# Patient Record
Sex: MALE | Race: WHITE | NOT HISPANIC OR LATINO | Employment: FULL TIME | URBAN - METROPOLITAN AREA
[De-identification: names, ages, dates, MRNs, and addresses within clinical notes are randomized per-mention and may not be internally consistent; named-entity substitution may affect disease eponyms.]

---

## 2017-12-23 ENCOUNTER — HOSPITAL ENCOUNTER (EMERGENCY)
Facility: HOSPITAL | Age: 32
Discharge: HOME | End: 2017-12-23
Attending: EMERGENCY MEDICINE | Admitting: EMERGENCY MEDICINE

## 2017-12-23 VITALS
RESPIRATION RATE: 16 BRPM | BODY MASS INDEX: 24.25 KG/M2 | TEMPERATURE: 98.3 F | SYSTOLIC BLOOD PRESSURE: 129 MMHG | HEART RATE: 64 BPM | WEIGHT: 160 LBS | DIASTOLIC BLOOD PRESSURE: 79 MMHG | HEIGHT: 68 IN | OXYGEN SATURATION: 98 %

## 2017-12-23 DIAGNOSIS — J11.1 INFLUENZA: Primary | ICD-10-CM

## 2017-12-23 PROCEDURE — 99283 EMERGENCY DEPT VISIT LOW MDM: CPT

## 2017-12-23 PROCEDURE — 99282 EMERGENCY DEPT VISIT SF MDM: CPT | Performed by: EMERGENCY MEDICINE

## 2017-12-23 RX ORDER — ALBUTEROL SULFATE 90 UG/1
AEROSOL, METERED RESPIRATORY (INHALATION)
Qty: 1 INHALER | Refills: 0 | Status: SHIPPED | OUTPATIENT
Start: 2017-12-23 | End: 2018-05-05

## 2017-12-23 RX ORDER — IBUPROFEN 800 MG/1
TABLET ORAL
Qty: 30 TABLET | Refills: 0 | Status: SHIPPED | OUTPATIENT
Start: 2017-12-23 | End: 2018-05-05

## 2017-12-23 RX ORDER — MULTIPLE VITAMINS W/ MINERALS TAB 9MG-400MCG
1 TAB ORAL DAILY
COMMUNITY
End: 2018-05-05

## 2017-12-23 RX ORDER — LEVOTHYROXINE SODIUM 0.1 MG/1
100 TABLET ORAL DAILY
COMMUNITY
End: 2018-05-05

## 2017-12-24 NOTE — ED PROVIDER NOTES
Subjective   History of Present Illness     History of Present Illness    Chief complaint: Flulike symptoms    Location: Head, chest    Quality/Severity:  Moderate    Timing/Duration: 4 days    Modifying Factors: Worse with exertion    Associated Symptoms: Chills, diminished appetite, mildly productive cough, frontal headache, generalized weakness, sinus drainage    Narrative: 32-year-old male with flulike symptoms for 4 days.  No nausea, vomiting or diarrhea.  Tolerating fluids and maintaining hydration well.    Review of Systems  All other systems reviewed and are otherwise negative.  Past Medical History:   Diagnosis Date   • Disease of thyroid gland        No Known Allergies    Past Surgical History:   Procedure Laterality Date   • VASECTOMY         History reviewed. No pertinent family history.    Social History     Social History   • Marital status: Single     Spouse name: N/A   • Number of children: N/A   • Years of education: N/A     Social History Main Topics   • Smoking status: Current Every Day Smoker     Packs/day: 1.00   • Smokeless tobacco: None   • Alcohol use No   • Drug use: Defer   • Sexual activity: Defer     Other Topics Concern   • None     Social History Narrative   • None       ED Triage Vitals   Temp Heart Rate Resp BP SpO2   12/23/17 1907 12/23/17 1907 12/23/17 1907 12/23/17 1907 12/23/17 1907   98.1 °F (36.7 °C) 90 20 139/88 9 %      Temp src Heart Rate Source Patient Position BP Location FiO2 (%)   12/23/17 1907 12/23/17 2132 12/23/17 1907 12/23/17 1907 --   Oral Monitor Sitting Right arm          Objective   Physical Exam   Constitutional: He is oriented to person, place, and time. He appears well-developed. No distress.   Not overtly toxic appearing   HENT:   Head: Normocephalic.   Mouth/Throat: Oropharynx is clear and moist.   Eyes: Conjunctivae are normal. No scleral icterus.   Neck: Neck supple.   Painless movement   Cardiovascular: Normal rate and regular rhythm.    Pulmonary/Chest:  Effort normal and breath sounds normal. No respiratory distress.   Abdominal: Soft. There is no tenderness.   Musculoskeletal:   MAEE, normal strength   Neurological: He is alert and oriented to person, place, and time.   Skin: Skin is warm and dry.   Psychiatric: He has a normal mood and affect. Thought content normal.   Nursing note and vitals reviewed.      Procedures         ED Course  ED Course   Comment By Time   Widespread influenza in this area.  No testing indicated given the patient is outside the window for Tamiflu.  Treat symptomatically.  Patient agreeable with plan. Adriel Humphries MD 12/23 2215                  [facility]    Final diagnoses:   Influenza              Medication List      New Prescriptions          albuterol 108 (90 Base) MCG/ACT inhaler   Commonly known as:  PROVENTIL HFA;VENTOLIN HFA   Inhale 2 puffs every 4 hours when necessary wheeze, dyspnea, cough       ibuprofen 800 MG tablet   Commonly known as:  ADVIL,MOTRIN   Take one tablet by mouth every 8 hours as needed for pain           Follow-up Information     Schedule an appointment as soon as possible for a visit with Your primary care provider.    Why:  Smoking cessation counseling and regular health maintenance               Adriel Humphries MD  12/23/17 1687

## 2017-12-31 ENCOUNTER — APPOINTMENT (OUTPATIENT)
Dept: GENERAL RADIOLOGY | Facility: HOSPITAL | Age: 32
End: 2017-12-31

## 2017-12-31 ENCOUNTER — HOSPITAL ENCOUNTER (EMERGENCY)
Facility: HOSPITAL | Age: 32
Discharge: HOME | End: 2017-12-31
Admitting: EMERGENCY MEDICINE

## 2017-12-31 VITALS
SYSTOLIC BLOOD PRESSURE: 123 MMHG | DIASTOLIC BLOOD PRESSURE: 70 MMHG | HEIGHT: 70 IN | HEART RATE: 72 BPM | OXYGEN SATURATION: 99 % | RESPIRATION RATE: 16 BRPM | WEIGHT: 155 LBS | TEMPERATURE: 98.7 F | BODY MASS INDEX: 22.19 KG/M2

## 2017-12-31 DIAGNOSIS — R05.9 COUGH: Primary | ICD-10-CM

## 2017-12-31 DIAGNOSIS — R11.2 NAUSEA AND VOMITING IN ADULT PATIENT: ICD-10-CM

## 2017-12-31 LAB — S PYO AG THROAT QL: NEGATIVE

## 2017-12-31 PROCEDURE — 87081 CULTURE SCREEN ONLY: CPT | Performed by: NURSE PRACTITIONER

## 2017-12-31 PROCEDURE — 87880 STREP A ASSAY W/OPTIC: CPT | Performed by: NURSE PRACTITIONER

## 2017-12-31 PROCEDURE — 99284 EMERGENCY DEPT VISIT MOD MDM: CPT | Performed by: NURSE PRACTITIONER

## 2017-12-31 PROCEDURE — 71020 HC CHEST PA AND LATERAL: CPT

## 2017-12-31 PROCEDURE — 99283 EMERGENCY DEPT VISIT LOW MDM: CPT

## 2017-12-31 RX ORDER — SODIUM CHLORIDE 0.9 % (FLUSH) 0.9 %
10 SYRINGE (ML) INJECTION AS NEEDED
Status: DISCONTINUED | OUTPATIENT
Start: 2017-12-31 | End: 2017-12-31

## 2017-12-31 RX ORDER — ONDANSETRON 4 MG/1
4 TABLET, ORALLY DISINTEGRATING ORAL EVERY 6 HOURS PRN
Qty: 12 TABLET | Refills: 0 | Status: SHIPPED | OUTPATIENT
Start: 2017-12-31 | End: 2018-05-05

## 2017-12-31 RX ORDER — BENZONATATE 100 MG/1
100 CAPSULE ORAL 3 TIMES DAILY PRN
Qty: 9 CAPSULE | Refills: 0 | Status: SHIPPED | OUTPATIENT
Start: 2017-12-31 | End: 2018-01-03

## 2018-01-02 LAB — BACTERIA SPEC AEROBE CULT: NORMAL

## 2018-05-01 ENCOUNTER — HOSPITAL ENCOUNTER (EMERGENCY)
Facility: HOSPITAL | Age: 33
Discharge: HOME | End: 2018-05-01
Attending: EMERGENCY MEDICINE | Admitting: EMERGENCY MEDICINE

## 2018-05-01 VITALS
SYSTOLIC BLOOD PRESSURE: 119 MMHG | OXYGEN SATURATION: 96 % | RESPIRATION RATE: 12 BRPM | WEIGHT: 160 LBS | BODY MASS INDEX: 22.4 KG/M2 | HEART RATE: 93 BPM | TEMPERATURE: 98.3 F | HEIGHT: 71 IN | DIASTOLIC BLOOD PRESSURE: 76 MMHG

## 2018-05-01 DIAGNOSIS — R10.13 DYSPEPSIA: Primary | ICD-10-CM

## 2018-05-01 PROCEDURE — 99282 EMERGENCY DEPT VISIT SF MDM: CPT | Performed by: EMERGENCY MEDICINE

## 2018-05-01 PROCEDURE — 99282 EMERGENCY DEPT VISIT SF MDM: CPT

## 2018-05-01 RX ORDER — RANITIDINE 150 MG/1
TABLET ORAL
Qty: 60 TABLET | Refills: 0 | OUTPATIENT
Start: 2018-05-01 | End: 2019-08-16

## 2018-05-01 NOTE — ED PROVIDER NOTES
Subjective   History of Present Illness     History of Present Illness    Chief complaint: Epigastric pain    Location: As above    Quality/Severity:  7 out of 10 at onset; 110 currently    Timing/Duration: Abrupt onset this morning    Modifying Factors: Had not eaten, resolving now    Associated Symptoms: No nausea or vomiting.  No chest pain or shortness of breath.    Narrative: 32-year-old male who has a remote history of reflux at a sudden recurrence this morning because significant epigastric discomfort.  He had not eaten breakfast this morning but was quite hungry.  Positive smoker.  1 L of soda daily.    PCP: None    Review of Systems  Denies black or bloody stools.  No vomiting, diarrhea, dysuria or hematuria reported.  No fever.  All other systems reviewed are otherwise negative as related chief complaint.  Past Medical History:   Diagnosis Date   • Depression    • Migraine    • Shoulder pain        Allergies   Allergen Reactions   • Cinnamon    • Coconut Oil        Past Surgical History:   Procedure Laterality Date   • VASECTOMY         History reviewed. No pertinent family history.    Social History     Social History   • Marital status: Single     Social History Main Topics   • Smoking status: Current Every Day Smoker     Packs/day: 0.50     Types: Cigarettes   • Smokeless tobacco: Never Used   • Alcohol use No   • Drug use: No      Comment: Pt last used a few months ago.   • Sexual activity: Defer     Other Topics Concern   • Not on file       ED Triage Vitals [05/01/18 0858]   Temp Heart Rate Resp BP SpO2   98.3 °F (36.8 °C) 93 12 119/76 96 %      Temp src Heart Rate Source Patient Position BP Location FiO2 (%)   Oral Monitor Lying Right arm --     Objective   Physical Exam   Constitutional: He is oriented to person, place, and time. He appears well-developed. No distress.   HENT:   Head: Normocephalic.   Mouth/Throat: Oropharynx is clear and moist.   Eyes: Conjunctivae are normal. No scleral icterus.    Neck: Neck supple.   Painless movement   Cardiovascular: Normal rate and regular rhythm.    Pulmonary/Chest: Effort normal and breath sounds normal. No respiratory distress.   Abdominal: Soft. There is no tenderness.   Negative Palma's   Musculoskeletal:   MAEE, normal strength   Neurological: He is alert and oriented to person, place, and time.   Skin: Skin is warm and dry.   Psychiatric: He has a normal mood and affect. Thought content normal.   Nursing note and vitals reviewed.      Procedures         ED Course  ED Course   Comment By Time   Reassurance provided.  Recommend change in diet.  Reviewed red flags indicating ED return. Adriel Humphries MD 05/01 0628                  [facility]    Final diagnoses:   Dyspepsia              Medication List      New Prescriptions    raNITIdine 150 MG tablet  Commonly known as:  ZANTAC  Take one tablet by mouth twice daily          Follow-up Information     Schedule an appointment as soon as possible for a visit  with Your primary care provider.                      Adriel Humphries MD  05/01/18 0577

## 2018-05-05 ENCOUNTER — APPOINTMENT (OUTPATIENT)
Dept: GENERAL RADIOLOGY | Facility: HOSPITAL | Age: 33
End: 2018-05-05

## 2018-05-05 ENCOUNTER — HOSPITAL ENCOUNTER (EMERGENCY)
Facility: HOSPITAL | Age: 33
Discharge: HOME | End: 2018-05-05
Attending: EMERGENCY MEDICINE | Admitting: EMERGENCY MEDICINE

## 2018-05-05 VITALS
TEMPERATURE: 98.3 F | BODY MASS INDEX: 23.14 KG/M2 | OXYGEN SATURATION: 100 % | HEIGHT: 71 IN | SYSTOLIC BLOOD PRESSURE: 119 MMHG | DIASTOLIC BLOOD PRESSURE: 71 MMHG | HEART RATE: 84 BPM | RESPIRATION RATE: 18 BRPM | WEIGHT: 165.3 LBS

## 2018-05-05 DIAGNOSIS — S60.211A CONTUSION OF RIGHT WRIST, INITIAL ENCOUNTER: Primary | ICD-10-CM

## 2018-05-05 PROCEDURE — 73110 X-RAY EXAM OF WRIST: CPT

## 2018-05-05 PROCEDURE — 99283 EMERGENCY DEPT VISIT LOW MDM: CPT

## 2018-05-05 PROCEDURE — 99282 EMERGENCY DEPT VISIT SF MDM: CPT | Performed by: EMERGENCY MEDICINE

## 2018-05-05 NOTE — ED PROVIDER NOTES
Subjective     History provided by:  Patient    History of Present Illness    · Chief complaint: Right wrist injury    · Location: Right wrist    · Quality/Severity: Soreness of the right wrist.    · Timing/Onset: The patient got his right wrist caught between a dresser he was moving in the wall yesterday.    · Modifying Factors: Movement of the right wrist exacerbates pain.    · Associated symptoms: He denies any numbness or loss of use of his fingers.  He denies other injuries.    · Narrative: The patient is a 32-year-old white male that was helping a friend move a dresser yesterday when the friend let go of the dresser and the dresser and his right wrist between the wall and the dresser.  He complains of pain in his right wrist since.  His past medical history significant for migraines and depression.  Past surgical history significant for vasectomy.  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    ED Triage Vitals [05/05/18 1407]   Temp Heart Rate Resp BP SpO2   98.3 °F (36.8 °C) 84 18 119/71 100 %      Temp src Heart Rate Source Patient Position BP Location FiO2 (%)   Oral Monitor Lying Right arm --       Review of Systems   Constitutional: Negative for activity change, appetite change, chills, diaphoresis, fatigue and fever.   HENT: Negative for congestion, dental problem, ear pain, hearing loss, mouth sores, postnasal drip, rhinorrhea, sinus pressure, sore throat and voice change.    Eyes: Negative for photophobia, pain, discharge, redness and visual disturbance.   Respiratory: Negative for cough, chest tightness, shortness of breath, wheezing and stridor.    Cardiovascular: Negative for chest pain, palpitations and leg swelling.   Gastrointestinal: Negative for abdominal pain, diarrhea, nausea and vomiting.   Genitourinary: Negative for difficulty urinating, dysuria, flank pain, frequency, hematuria  and urgency.   Musculoskeletal: Negative for arthralgias, back pain, gait problem, joint swelling, myalgias, neck pain and neck stiffness.   Skin: Negative for color change and rash.   Neurological: Negative for dizziness, tremors, seizures, syncope, facial asymmetry, speech difficulty, weakness, light-headedness, numbness and headaches.   Hematological: Negative for adenopathy.   Psychiatric/Behavioral: Negative.  Negative for confusion and decreased concentration. The patient is not nervous/anxious.        Past Medical History:   Diagnosis Date   • Depression    • Migraine    • Shoulder pain        Allergies   Allergen Reactions   • Cinnamon    • Coconut Oil        Past Surgical History:   Procedure Laterality Date   • VASECTOMY         History reviewed. No pertinent family history.    Social History     Social History   • Marital status: Single     Social History Main Topics   • Smoking status: Current Every Day Smoker     Packs/day: 0.50     Types: Cigarettes   • Smokeless tobacco: Never Used   • Alcohol use No   • Drug use: No      Comment: Pt last used a few months ago.   • Sexual activity: Defer     Other Topics Concern   • Not on file           Objective   Physical Exam   Constitutional: He is oriented to person, place, and time. He appears well-developed and well-nourished. No distress.   The patient has a thin build.  He appears in no acute distress.  His vital signs are within normal limits.   HENT:   Head: Normocephalic and atraumatic.   Eyes: Conjunctivae are normal.   Neck: Neck supple.   Musculoskeletal:   Right wrist has no swelling or deformity.  There is no snuffbox tenderness.  He complains of pain over his distal radius and distal ulna, but there is no deformity there.  He does have discomfort with range of motion of his right wrist.  There is no ecchymosis.  Right hand is nontender without deformity and is neurovascularly intact.   Neurological: He is alert and oriented to person, place, and  time. No cranial nerve deficit.   No focal motor sensory deficit.   Skin: Skin is warm and dry. No rash noted. He is not diaphoretic. No erythema. No pallor.   Psychiatric: He has a normal mood and affect. His behavior is normal. Judgment and thought content normal.   Nursing note and vitals reviewed.      Procedures           ED Course  ED Course   Comment By Time   Rei Report [id number] is blank Rick Dawkins MD 05/05 1500   Cockup splint applied to right wrist by tech.  Fingers and right hand neurovascular intact after placement. Rick Dawkins MD 05/05 1501   The patient's x-ray did not show an acute fracture to my interpretation.  Is my impression that he has a contusion to his right wrist. Rick Dawkins MD 05/05 1501                  MDM  Number of Diagnoses or Management Options  Contusion of right wrist, initial encounter: new and requires workup     Amount and/or Complexity of Data Reviewed  Tests in the radiology section of CPT®: ordered and reviewed  Independent visualization of images, tracings, or specimens: yes    Patient Progress  Patient progress: stable        Final diagnoses:   Contusion of right wrist, initial encounter           Labs Reviewed - No data to display  XR Wrist 3+ View Right   ED Interpretation   No acute fracture or dislocation.             Medication List      No changes were made to your prescriptions during this visit.            Rick Dawkins MD  05/06/18 0156

## 2019-07-03 ENCOUNTER — HOSPITAL ENCOUNTER (EMERGENCY)
Facility: HOSPITAL | Age: 34
Discharge: HOME | End: 2019-07-03
Attending: EMERGENCY MEDICINE | Admitting: EMERGENCY MEDICINE

## 2019-07-03 VITALS
HEIGHT: 71 IN | DIASTOLIC BLOOD PRESSURE: 77 MMHG | TEMPERATURE: 98.1 F | WEIGHT: 188 LBS | RESPIRATION RATE: 16 BRPM | OXYGEN SATURATION: 99 % | SYSTOLIC BLOOD PRESSURE: 116 MMHG | HEART RATE: 73 BPM | BODY MASS INDEX: 26.32 KG/M2

## 2019-07-03 DIAGNOSIS — L74.0 HEAT RASH: Primary | ICD-10-CM

## 2019-07-03 PROCEDURE — 99282 EMERGENCY DEPT VISIT SF MDM: CPT | Performed by: EMERGENCY MEDICINE

## 2019-07-03 PROCEDURE — 99282 EMERGENCY DEPT VISIT SF MDM: CPT

## 2019-07-03 RX ORDER — PREDNISONE 10 MG/1
TABLET ORAL
Qty: 21 TABLET | Refills: 0 | OUTPATIENT
Start: 2019-07-03 | End: 2019-07-12

## 2019-07-03 RX ORDER — HYDROXYZINE PAMOATE 25 MG/1
25 CAPSULE ORAL 4 TIMES DAILY PRN
Qty: 30 CAPSULE | Refills: 0 | OUTPATIENT
Start: 2019-07-03 | End: 2019-08-27

## 2019-07-03 NOTE — DISCHARGE INSTRUCTIONS
Medications as directed.  Follow-up with allergist as above.  Return to ED for medical emergencies.

## 2019-07-03 NOTE — ED PROVIDER NOTES
Subjective   Mr. Sarah Mcclain 33-year-old white male who presents secondary to rash on bilateral lower legs.  Patient reports onset approximately 1 month ago.  No known exposure to allergens.  Patient began a new job approximately 5 weeks ago.  Symptoms began approximately 1 week after.  The symptoms are worsened when patient wears long pants.  Associated itching.  Patient presents for evaluation.        History provided by:  Patient  Rash   Location:  Leg  Leg rash location:  L lower leg and R lower leg  Quality: itchiness and redness    Severity:  Moderate  Onset quality:  Gradual  Duration:  1 month  Timing:  Constant  Progression:  Waxing and waning  Chronicity:  New  Context: not insect bite/sting, not medications, not new detergent/soap and not plant contact    Context comment:  As described above.  Relieved by:  Nothing  Worsened by:  Heat  Ineffective treatments:  Anti-itch cream  Associated symptoms: no abdominal pain, no diarrhea, no fever, no hoarse voice, no induration, no joint pain, no myalgias, no nausea, no periorbital edema, no shortness of breath, no throat swelling, no tongue swelling, not vomiting and not wheezing        Review of Systems   Constitutional: Negative for fever.   HENT: Negative for hoarse voice and rhinorrhea.    Eyes: Negative for itching.   Respiratory: Negative for shortness of breath and wheezing.    Gastrointestinal: Negative for abdominal pain, diarrhea, nausea and vomiting.   Genitourinary: Negative for dysuria.   Musculoskeletal: Negative for arthralgias and myalgias.   Skin: Positive for rash.   Neurological: Negative for syncope.   All other systems reviewed and are negative.      Past Medical History:   Diagnosis Date   • Depression    • Migraine    • Shoulder pain        Allergies   Allergen Reactions   • Cinnamon    • Coconut Oil        Past Surgical History:   Procedure Laterality Date   • VASECTOMY         History reviewed. No pertinent family history.    Social  History     Socioeconomic History   • Marital status: Single     Spouse name: Not on file   • Number of children: Not on file   • Years of education: Not on file   • Highest education level: Not on file   Tobacco Use   • Smoking status: Current Every Day Smoker     Packs/day: 0.50     Types: Cigarettes   • Smokeless tobacco: Never Used   Substance and Sexual Activity   • Alcohol use: No   • Drug use: Yes     Types: Marijuana     Comment: occational    • Sexual activity: Defer           Objective   Physical Exam   Constitutional: He is oriented to person, place, and time. He appears well-developed and well-nourished. No distress.   33-year-old white male laying in bed.  Patient appears in good overall health.  Vital signs unremarkable.  Patient is healthy and well-appearing.   HENT:   Head: Normocephalic and atraumatic.   Right Ear: External ear normal.   Left Ear: External ear normal.   Nose: Nose normal.   Mouth/Throat: Oropharynx is clear and moist.   Eyes: Conjunctivae and EOM are normal. Pupils are equal, round, and reactive to light.   Neck: Normal range of motion. Neck supple.   Cardiovascular: Normal rate, regular rhythm, normal heart sounds and intact distal pulses. Exam reveals no gallop and no friction rub.   No murmur heard.  Pulmonary/Chest: Effort normal and breath sounds normal. No stridor. No respiratory distress. He has no wheezes. He has no rales.   Abdominal: Soft. He exhibits no distension. There is no tenderness.   Musculoskeletal: Normal range of motion. He exhibits no edema.   Neurological: He is alert and oriented to person, place, and time. He has normal reflexes. No cranial nerve deficit.   Skin: Skin is warm and dry. Capillary refill takes less than 2 seconds. Rash noted. No purpura noted. Rash is urticarial. Rash is not macular, not nodular, not pustular and not vesicular. He is not diaphoretic. There is erythema.        Psychiatric: He has a normal mood and affect. His behavior is normal.    Nursing note and vitals reviewed.      Procedures           ED Course  ED Course as of Jul 03 1638   Wed Jul 03, 2019   0806 Patient has very faint rash bilateral lower extremities-left greater than right.  A few tiny areas of excoriation.  I suspect this is a heat rash.  Will prescribe steroids and Atarax.  Giving dermatology for follow-up.  [SS]      ED Course User Index  [SS] Alberto Szymanski MD      My differential diagnosis for rash includes but is not limited to allergic reaction, hives, urticaria, erythema multiforme, drug rash, contact dermatitis, soft tissue infection, cellulitis, abscess, impetigo, eczema, psoriasis, hidradenitis superlative, meningococcemia, sepsis, toxic shock syndrome, Herscher spotted fever, Lyme disease, disseminated gonococcemia, syphilis, scarlet fever, scarlatina, chickenpox, herpes zoster, viral exanthem, pityriasis rosea, scabies, bedbugs and allergic reaction.              MDM  Number of Diagnoses or Management Options  Heat rash: new and does not require workup  Risk of Complications, Morbidity, and/or Mortality  Presenting problems: low  Diagnostic procedures: low  Management options: moderate    Patient Progress  Patient progress: improved        Final diagnoses:   Heat rash            Alberto Szymanski MD  07/03/19 0013

## 2019-07-12 ENCOUNTER — HOSPITAL ENCOUNTER (EMERGENCY)
Facility: HOSPITAL | Age: 34
Discharge: HOME | End: 2019-07-12
Attending: EMERGENCY MEDICINE | Admitting: EMERGENCY MEDICINE

## 2019-07-12 VITALS
WEIGHT: 188 LBS | RESPIRATION RATE: 16 BRPM | DIASTOLIC BLOOD PRESSURE: 82 MMHG | OXYGEN SATURATION: 96 % | BODY MASS INDEX: 26.32 KG/M2 | HEART RATE: 101 BPM | SYSTOLIC BLOOD PRESSURE: 138 MMHG | TEMPERATURE: 98.3 F | HEIGHT: 71 IN

## 2019-07-12 DIAGNOSIS — L50.9 URTICARIAL RASH: Primary | ICD-10-CM

## 2019-07-12 PROCEDURE — 96372 THER/PROPH/DIAG INJ SC/IM: CPT

## 2019-07-12 PROCEDURE — 25010000002 METHYLPREDNISOLONE PER 40 MG: Performed by: EMERGENCY MEDICINE

## 2019-07-12 PROCEDURE — 99282 EMERGENCY DEPT VISIT SF MDM: CPT

## 2019-07-12 PROCEDURE — 99283 EMERGENCY DEPT VISIT LOW MDM: CPT

## 2019-07-12 PROCEDURE — 99282 EMERGENCY DEPT VISIT SF MDM: CPT | Performed by: EMERGENCY MEDICINE

## 2019-07-12 RX ORDER — HYDROXYZINE HYDROCHLORIDE 25 MG/1
25 TABLET, FILM COATED ORAL ONCE
Status: DISCONTINUED | OUTPATIENT
Start: 2019-07-12 | End: 2019-07-12 | Stop reason: HOSPADM

## 2019-07-12 RX ORDER — HYDROXYZINE HYDROCHLORIDE 25 MG/1
TABLET, FILM COATED ORAL
Status: COMPLETED
Start: 2019-07-12 | End: 2019-07-12

## 2019-07-12 RX ORDER — METHYLPREDNISOLONE 4 MG/1
TABLET ORAL
Qty: 21 TABLET | Refills: 0 | OUTPATIENT
Start: 2019-07-12 | End: 2019-08-27

## 2019-07-12 RX ORDER — METHYLPREDNISOLONE SODIUM SUCCINATE 40 MG/ML
80 INJECTION, POWDER, LYOPHILIZED, FOR SOLUTION INTRAMUSCULAR; INTRAVENOUS ONCE
Status: COMPLETED | OUTPATIENT
Start: 2019-07-12 | End: 2019-07-12

## 2019-07-12 RX ADMIN — HYDROXYZINE HYDROCHLORIDE: 25 TABLET, FILM COATED ORAL at 13:19

## 2019-07-12 RX ADMIN — METHYLPREDNISOLONE SODIUM SUCCINATE 80 MG: 40 INJECTION, POWDER, FOR SOLUTION INTRAMUSCULAR; INTRAVENOUS at 13:12

## 2019-07-12 NOTE — ED PROVIDER NOTES
Subjective   History of Present Illness  History of Present Illness    Chief complaint: Rash    Location: Both legs and around the waist and back    Quality/Severity: Mild itching discomfort    Timing/Duration: Present for over 1 week    Modifying Factors: None    Narrative: This patient presents for evaluation of a persistent rash around his legs and waist area.  He was seen here about 1 week ago for the same.  It was presumed to be a heat rash at that time.  He was given prescriptions for Atarax and steroids.  He says he cannot afford the medicine so he is been using some over-the-counter topical steroid cream.  He tried to make an appoint with dermatology for follow-up but they cannot see him for another month.  He denies any fevers.  He says it is still itchy and seems to be spreading and increasing with redness around the legs.  He has not had any fevers.  He is not currently taking any antibiotics or other prescription medicines.    Associated Symptoms: None    Review of Systems   Constitutional: Negative for activity change and fever.   Respiratory: Negative for shortness of breath.    Cardiovascular: Negative for chest pain.   Gastrointestinal: Negative for abdominal pain, nausea and vomiting.   Skin: Positive for rash. Negative for color change and wound.   Neurological: Negative for syncope, weakness and numbness.   All other systems reviewed and are negative.      Past Medical History:   Diagnosis Date   • Depression    • Migraine    • Shoulder pain        Allergies   Allergen Reactions   • Cinnamon    • Coconut Oil        Past Surgical History:   Procedure Laterality Date   • VASECTOMY         History reviewed. No pertinent family history.    Social History     Socioeconomic History   • Marital status: Single     Spouse name: Not on file   • Number of children: Not on file   • Years of education: Not on file   • Highest education level: Not on file   Tobacco Use   • Smoking status: Current Every Day  Smoker     Packs/day: 0.50     Types: Cigarettes   • Smokeless tobacco: Never Used   Substance and Sexual Activity   • Alcohol use: No   • Drug use: Yes     Types: Marijuana     Comment: occational    • Sexual activity: Defer     ED Triage Vitals [07/12/19 1238]   Temp Heart Rate Resp BP SpO2   98.3 °F (36.8 °C) 101 16 138/82 96 %      Temp src Heart Rate Source Patient Position BP Location FiO2 (%)   -- -- -- -- --           Objective   Physical Exam   Constitutional: He is oriented to person, place, and time. He appears well-developed and well-nourished.   HENT:   Head: Normocephalic and atraumatic.   Eyes: EOM are normal. Pupils are equal, round, and reactive to light. Right eye exhibits no discharge. Left eye exhibits no discharge.   Neck: Normal range of motion. Neck supple.   Cardiovascular: Normal rate, regular rhythm and intact distal pulses.   Pulmonary/Chest: Effort normal. No respiratory distress.   Musculoskeletal: Normal range of motion. He exhibits no edema or deformity.   Neurological: He is alert and oriented to person, place, and time. No sensory deficit. He exhibits normal muscle tone.   Skin: Skin is warm and dry. Rash noted. No erythema.   Moderate urticarial rash noted diffusely around both of the legs up to the level of the waist.  Mild excoriations noted from scratching.  No areas of fluctuance or erythematous infectious changes are appreciated.   Psychiatric: He has a normal mood and affect. His behavior is normal. Judgment and thought content normal.   Nursing note and vitals reviewed.      Procedures           ED Course  ED Course as of Jul 12 1426 Fri Jul 12, 2019   1425 Patient presented for a persistent rash which now appears to have some urticarial features.  I gave him a Solu-Medrol injection here and 1 dose of Atarax.  Will prescribe a Medrol Dosepak for him and encouraged him to keep taking antihistamines.  Advised follow-up with primary care doctor and with dermatology clinic as  "soon as available.  Gave him the usual \"return to ER\" instructions for any worsening signs or symptoms.  [YAA]      ED Course User Index  [YAA] J Luis Navas MD                  [facility]      Final diagnoses:   Urticarial rash            J Luis Navas MD  07/12/19 1426    "

## 2019-07-12 NOTE — DISCHARGE INSTRUCTIONS
Take steroid tapering pack as directed.  May also use antihistamine such as Benadryl or Claritin daily as needed for itching.  Follow-up with your family doctor for repeat evaluation.  Also should follow-up with dermatologist as previously discussed.  May return to the emergency room for any worsening redness, swelling, fevers, pain or any other concerns.

## 2019-07-12 NOTE — ED NOTES
Patient complaining of a rash on his lower extremities that worsens and improves over the last 2 weeks. Patient complaining of itching.      Nae Odom RN  07/12/19 5213

## 2019-08-16 ENCOUNTER — APPOINTMENT (OUTPATIENT)
Dept: GENERAL RADIOLOGY | Facility: HOSPITAL | Age: 34
End: 2019-08-16

## 2019-08-16 ENCOUNTER — HOSPITAL ENCOUNTER (EMERGENCY)
Facility: HOSPITAL | Age: 34
Discharge: HOME | End: 2019-08-16
Attending: EMERGENCY MEDICINE | Admitting: EMERGENCY MEDICINE

## 2019-08-16 VITALS
DIASTOLIC BLOOD PRESSURE: 70 MMHG | BODY MASS INDEX: 26.05 KG/M2 | OXYGEN SATURATION: 97 % | HEART RATE: 79 BPM | SYSTOLIC BLOOD PRESSURE: 116 MMHG | HEIGHT: 71 IN | WEIGHT: 186.1 LBS | TEMPERATURE: 97.8 F | RESPIRATION RATE: 18 BRPM

## 2019-08-16 DIAGNOSIS — K52.9 GASTROENTERITIS: Primary | ICD-10-CM

## 2019-08-16 LAB
ALBUMIN SERPL-MCNC: 5 G/DL (ref 3.5–5.2)
ALBUMIN/GLOB SERPL: 1.4 G/DL
ALP SERPL-CCNC: 93 U/L (ref 39–117)
ALT SERPL W P-5'-P-CCNC: 14 U/L (ref 1–41)
ANION GAP SERPL CALCULATED.3IONS-SCNC: 13.4 MMOL/L (ref 5–15)
AST SERPL-CCNC: 18 U/L (ref 1–40)
BASOPHILS # BLD AUTO: 0.04 10*3/MM3 (ref 0–0.2)
BASOPHILS NFR BLD AUTO: 0.7 % (ref 0–1.5)
BILIRUB SERPL-MCNC: 0.3 MG/DL (ref 0.2–1.2)
BUN BLD-MCNC: 12 MG/DL (ref 6–20)
BUN/CREAT SERPL: 13.6 (ref 7–25)
CALCIUM SPEC-SCNC: 10.1 MG/DL (ref 8.6–10.5)
CHLORIDE SERPL-SCNC: 100 MMOL/L (ref 98–107)
CO2 SERPL-SCNC: 27.6 MMOL/L (ref 22–29)
CREAT BLD-MCNC: 0.88 MG/DL (ref 0.76–1.27)
DEPRECATED RDW RBC AUTO: 40.6 FL (ref 37–54)
EOSINOPHIL # BLD AUTO: 0.45 10*3/MM3 (ref 0–0.4)
EOSINOPHIL NFR BLD AUTO: 8.1 % (ref 0.3–6.2)
ERYTHROCYTE [DISTWIDTH] IN BLOOD BY AUTOMATED COUNT: 12.4 % (ref 12.3–15.4)
GFR SERPL CREATININE-BSD FRML MDRD: 100 ML/MIN/1.73
GLOBULIN UR ELPH-MCNC: 3.7 GM/DL
GLUCOSE BLD-MCNC: 103 MG/DL (ref 65–99)
HCT VFR BLD AUTO: 45.2 % (ref 37.5–51)
HGB BLD-MCNC: 14.8 G/DL (ref 13–17.7)
IMM GRANULOCYTES # BLD AUTO: 0.01 10*3/MM3 (ref 0–0.05)
IMM GRANULOCYTES NFR BLD AUTO: 0.2 % (ref 0–0.5)
LIPASE SERPL-CCNC: 23 U/L (ref 13–60)
LYMPHOCYTES # BLD AUTO: 2.45 10*3/MM3 (ref 0.7–3.1)
LYMPHOCYTES NFR BLD AUTO: 43.8 % (ref 19.6–45.3)
MCH RBC QN AUTO: 29.4 PG (ref 26.6–33)
MCHC RBC AUTO-ENTMCNC: 32.7 G/DL (ref 31.5–35.7)
MCV RBC AUTO: 89.7 FL (ref 79–97)
MONOCYTES # BLD AUTO: 0.45 10*3/MM3 (ref 0.1–0.9)
MONOCYTES NFR BLD AUTO: 8.1 % (ref 5–12)
NEUTROPHILS # BLD AUTO: 2.19 10*3/MM3 (ref 1.7–7)
NEUTROPHILS NFR BLD AUTO: 39.1 % (ref 42.7–76)
NRBC BLD AUTO-RTO: 0 /100 WBC (ref 0–0.2)
PLATELET # BLD AUTO: 221 10*3/MM3 (ref 140–450)
PMV BLD AUTO: 9.9 FL (ref 6–12)
POTASSIUM BLD-SCNC: 3.5 MMOL/L (ref 3.5–5.2)
PROT SERPL-MCNC: 8.7 G/DL (ref 6–8.5)
RBC # BLD AUTO: 5.04 10*6/MM3 (ref 4.14–5.8)
SODIUM BLD-SCNC: 141 MMOL/L (ref 136–145)
TROPONIN T SERPL-MCNC: <0.01 NG/ML (ref 0–0.03)
WBC NRBC COR # BLD: 5.59 10*3/MM3 (ref 3.4–10.8)

## 2019-08-16 PROCEDURE — 93005 ELECTROCARDIOGRAM TRACING: CPT

## 2019-08-16 PROCEDURE — 84484 ASSAY OF TROPONIN QUANT: CPT | Performed by: EMERGENCY MEDICINE

## 2019-08-16 PROCEDURE — 25010000002 ONDANSETRON PER 1 MG: Performed by: EMERGENCY MEDICINE

## 2019-08-16 PROCEDURE — 85025 COMPLETE CBC W/AUTO DIFF WBC: CPT | Performed by: EMERGENCY MEDICINE

## 2019-08-16 PROCEDURE — 96374 THER/PROPH/DIAG INJ IV PUSH: CPT

## 2019-08-16 PROCEDURE — 93010 ELECTROCARDIOGRAM REPORT: CPT | Performed by: INTERNAL MEDICINE

## 2019-08-16 PROCEDURE — 99283 EMERGENCY DEPT VISIT LOW MDM: CPT

## 2019-08-16 PROCEDURE — 93005 ELECTROCARDIOGRAM TRACING: CPT | Performed by: EMERGENCY MEDICINE

## 2019-08-16 PROCEDURE — 71045 X-RAY EXAM CHEST 1 VIEW: CPT

## 2019-08-16 PROCEDURE — 83690 ASSAY OF LIPASE: CPT | Performed by: EMERGENCY MEDICINE

## 2019-08-16 PROCEDURE — 99284 EMERGENCY DEPT VISIT MOD MDM: CPT | Performed by: EMERGENCY MEDICINE

## 2019-08-16 PROCEDURE — 80053 COMPREHEN METABOLIC PANEL: CPT | Performed by: EMERGENCY MEDICINE

## 2019-08-16 PROCEDURE — 96375 TX/PRO/DX INJ NEW DRUG ADDON: CPT

## 2019-08-16 RX ORDER — FAMOTIDINE 20 MG/1
20 TABLET, FILM COATED ORAL 2 TIMES DAILY
Qty: 30 TABLET | Refills: 0 | Status: SHIPPED | OUTPATIENT
Start: 2019-08-16

## 2019-08-16 RX ORDER — ONDANSETRON 8 MG/1
8 TABLET, ORALLY DISINTEGRATING ORAL EVERY 8 HOURS PRN
Qty: 10 TABLET | Refills: 0 | Status: SHIPPED | OUTPATIENT
Start: 2019-08-16

## 2019-08-16 RX ORDER — SUCRALFATE ORAL 1 G/10ML
1 SUSPENSION ORAL 4 TIMES DAILY
Qty: 420 ML | Refills: 0 | Status: SHIPPED | OUTPATIENT
Start: 2019-08-16

## 2019-08-16 RX ORDER — ONDANSETRON 2 MG/ML
4 INJECTION INTRAMUSCULAR; INTRAVENOUS ONCE
Status: COMPLETED | OUTPATIENT
Start: 2019-08-16 | End: 2019-08-16

## 2019-08-16 RX ADMIN — ONDANSETRON 4 MG: 2 INJECTION, SOLUTION INTRAMUSCULAR; INTRAVENOUS at 07:33

## 2019-08-16 RX ADMIN — FAMOTIDINE 20 MG: 10 INJECTION, SOLUTION INTRAVENOUS at 07:33

## 2019-08-16 RX ADMIN — SODIUM CHLORIDE 1000 ML: 9 INJECTION, SOLUTION INTRAVENOUS at 07:33

## 2019-08-16 NOTE — DISCHARGE INSTRUCTIONS
Clear liquids for 24 to 48 hours, then advance diet as tolerated.  Avoid spicy and fatty foods.  Follow-up with Dr. Les Garcia in 2 days.  Return to the emergency department if there is increasing pain, vomiting not controlled with Zofran, vomiting blood, bloody diarrhea, worse in any way at all.

## 2019-08-16 NOTE — ED PROVIDER NOTES
Subjective   History of Present Illness  History of Present Illness    Chief complaint: Chest pain and abdominal pain    Location: Epigastric and lower chest in the midline    Quality/Severity: Burning, moderate    Timing/Onset/Duration: Intermittent since Tuesday    Modifying Factors: Nothing seems to make it better    Associated Symptoms: Mild headache.  No fever chills or cough.  No sore throat earache.  The patient has some mild clear nasal congestion.  Minimal shortness of breath.  No diaphoresis.  Patient has had mild nonbloody diarrhea.  No burning on urination.    Narrative: This 33-year-old white male presents with lower chest and epigastric pain.  It has been intermittent since Tuesday.  The patient has not been on antibiotics recently.  No recent long trips.  The patient has no history of C. difficile.  Patient has not consumed untreated water.  He has not been around by his been sick.    PCP: Les Garcia      Review of Systems   Constitutional: Negative for chills and fever.   HENT: Negative for ear pain and sore throat.    Respiratory: Positive for shortness of breath (Mild). Negative for cough and chest tightness.    Cardiovascular: Positive for chest pain. Negative for palpitations and leg swelling.   Gastrointestinal: Positive for abdominal pain, diarrhea, nausea and vomiting. Negative for blood in stool and constipation.   Genitourinary: Negative for dysuria.   Musculoskeletal: Negative for back pain and neck stiffness.   Skin: Negative for rash.   Neurological: Positive for headaches. Negative for dizziness, speech difficulty, weakness and numbness.   Psychiatric/Behavioral: Negative.  Negative for confusion.        Medication List      ASK your doctor about these medications    hydrOXYzine pamoate 25 MG capsule  Commonly known as:  VISTARIL  Take 1 capsule by mouth 4 (Four) Times a Day As Needed for Itching for up   to 30 doses. 2 tabs if needed     methylPREDNISolone 4 MG tablet  Commonly  known as:  MEDROL (VIKI)  Take as directed on package instructions.     raNITIdine 150 MG tablet  Commonly known as:  ZANTAC  Take one tablet by mouth twice daily          Past Medical History:   Diagnosis Date   • Depression    • Migraine    • Shoulder pain        Allergies   Allergen Reactions   • Cinnamon    • Coconut Oil        Past Surgical History:   Procedure Laterality Date   • VASECTOMY         History reviewed. No pertinent family history.    Social History     Socioeconomic History   • Marital status: Single     Spouse name: Not on file   • Number of children: Not on file   • Years of education: Not on file   • Highest education level: Not on file   Tobacco Use   • Smoking status: Current Every Day Smoker     Packs/day: 0.50     Types: Cigarettes   • Smokeless tobacco: Never Used   Substance and Sexual Activity   • Alcohol use: No   • Drug use: Yes     Types: Marijuana     Comment: occational    • Sexual activity: Defer           Objective   Physical Exam   Constitutional: He is oriented to person, place, and time. He appears well-developed and well-nourished. No distress.   ED Triage Vitals (08/16/19 0636)  Temp: 97.8 °F (36.6 °C)  Heart Rate: 70  Resp: 22  BP: 125/75  SpO2: 99 %  Temp src: Oral  Heart Rate Source: n/a  Patient Position: Sitting  BP Location: Right arm  FiO2 (%): n/a    The patient's vitals were reviewed by me.  Unless otherwise noted they are within normal limits.     HENT:   Head: Normocephalic and atraumatic.   Right Ear: External ear normal.   Left Ear: External ear normal.   Nose: Nose normal.   Mouth/Throat: Oropharynx is clear and moist.   Eyes: Conjunctivae and EOM are normal. Pupils are equal, round, and reactive to light. Right eye exhibits no discharge. Left eye exhibits no discharge.   Neck: Normal range of motion. Neck supple. No JVD present. No tracheal deviation present. No thyromegaly present.   Cardiovascular: Normal rate, regular rhythm, normal heart sounds and intact  distal pulses. Exam reveals no gallop and no friction rub.   No murmur heard.  Pulmonary/Chest: Effort normal and breath sounds normal. No stridor. No respiratory distress. He has no wheezes. He has no rales. He exhibits no tenderness.   Abdominal: Soft. Bowel sounds are normal. He exhibits no distension and no mass. There is no tenderness. There is no rebound and no guarding. No hernia.   Musculoskeletal: Normal range of motion. He exhibits no edema or deformity.        Right lower leg: He exhibits no tenderness and no edema.        Left lower leg: He exhibits no tenderness and no edema.   Lymphadenopathy:     He has no cervical adenopathy.   Neurological: He is alert and oriented to person, place, and time.   Skin: Skin is dry. No rash noted. He is not diaphoretic. No erythema. No pallor.   Psychiatric: His behavior is normal.   Nursing note and vitals reviewed.      Procedures           ED Course  ED Course as of Aug 16 0833   Fri Aug 16, 2019   0731 The laboratory values were reviewed by me.  The serum glucose is 103.  The total protein is 8.7.  The laboratory values are otherwise unremarkable  [RC]      ED Course User Index  [RC] Sunday Boone MD      8:33 AM, 08/16/19:  The patient was reassessed.  He feels better.  He has no complaints of pain.  His vital signs reviewed and are stable.  The patient tolerated clear liquids.  Abdominal exam: Soft nontender no masses positive bowel sounds.    8:44 AM, 08/16/19:  The patient's diagnosis of gastroenteritis was discussed with him.  He should avoid spicy and fatty foods.  He should drink clear liquids for 24 to 48 hours, then advance diet as tolerated.  Patient should follow-up with Dr. Garcia in 2 days.  Patient will be written a prescription for Pepcid and Carafate.  All the patient's questions were answered the patient will be discharged in good condition.          MDM    XR Chest 1 View    (Results Pending)     Labs Reviewed   COMPREHENSIVE METABOLIC  PANEL   LIPASE   TROPONIN (IN-HOUSE)   CBC WITH AUTO DIFFERENTIAL   CBC AND DIFFERENTIAL    Narrative:     The following orders were created for panel order CBC & Differential.  Procedure                               Abnormality         Status                     ---------                               -----------         ------                     CBC Auto Differential[[id number]]                            In process                   Please view results for these tests on the individual orders.     No results found.    Final diagnoses:   Gastroenteritis         ED Medications:  Medications   sodium chloride 0.9 % bolus 1,000 mL (not administered)   ondansetron (ZOFRAN) injection 4 mg (not administered)   famotidine (PEPCID) injection 20 mg (not administered)       New Medications:     Medication List      ASK your doctor about these medications    hydrOXYzine pamoate 25 MG capsule  Commonly known as:  VISTARIL  Take 1 capsule by mouth 4 (Four) Times a Day As Needed for Itching for up   to 30 doses. 2 tabs if needed     methylPREDNISolone 4 MG tablet  Commonly known as:  MEDROL (VIKI)  Take as directed on package instructions.     raNITIdine 150 MG tablet  Commonly known as:  ZANTAC  Take one tablet by mouth twice daily          Stopped Medications:     Medication List      ASK your doctor about these medications    hydrOXYzine pamoate 25 MG capsule  Commonly known as:  VISTARIL  Take 1 capsule by mouth 4 (Four) Times a Day As Needed for Itching for up   to 30 doses. 2 tabs if needed     methylPREDNISolone 4 MG tablet  Commonly known as:  MEDROL (VIKI)  Take as directed on package instructions.     raNITIdine 150 MG tablet  Commonly known as:  ZANTAC  Take one tablet by mouth twice daily            Final diagnoses:   Gastroenteritis            Sunday Boone MD  08/16/19 9896

## 2019-08-27 ENCOUNTER — HOSPITAL ENCOUNTER (EMERGENCY)
Facility: HOSPITAL | Age: 34
Discharge: HOME | End: 2019-08-27
Attending: EMERGENCY MEDICINE | Admitting: EMERGENCY MEDICINE

## 2019-08-27 ENCOUNTER — APPOINTMENT (OUTPATIENT)
Dept: GENERAL RADIOLOGY | Facility: HOSPITAL | Age: 34
End: 2019-08-27

## 2019-08-27 VITALS
SYSTOLIC BLOOD PRESSURE: 137 MMHG | HEIGHT: 72 IN | BODY MASS INDEX: 25.06 KG/M2 | OXYGEN SATURATION: 98 % | WEIGHT: 185 LBS | TEMPERATURE: 99.4 F | HEART RATE: 87 BPM | RESPIRATION RATE: 15 BRPM | DIASTOLIC BLOOD PRESSURE: 78 MMHG

## 2019-08-27 DIAGNOSIS — S16.1XXA STRAIN OF NECK MUSCLE, INITIAL ENCOUNTER: Primary | ICD-10-CM

## 2019-08-27 PROCEDURE — 72050 X-RAY EXAM NECK SPINE 4/5VWS: CPT

## 2019-08-27 PROCEDURE — 25010000002 KETOROLAC TROMETHAMINE PER 15 MG: Performed by: EMERGENCY MEDICINE

## 2019-08-27 PROCEDURE — 96372 THER/PROPH/DIAG INJ SC/IM: CPT

## 2019-08-27 PROCEDURE — 99283 EMERGENCY DEPT VISIT LOW MDM: CPT

## 2019-08-27 PROCEDURE — 99282 EMERGENCY DEPT VISIT SF MDM: CPT | Performed by: PHYSICIAN ASSISTANT

## 2019-08-27 RX ORDER — GABAPENTIN 300 MG/1
300 CAPSULE ORAL 2 TIMES DAILY
COMMUNITY

## 2019-08-27 RX ORDER — METHOCARBAMOL 750 MG/1
750 TABLET, FILM COATED ORAL 3 TIMES DAILY PRN
Qty: 20 TABLET | Refills: 0 | Status: SHIPPED | OUTPATIENT
Start: 2019-08-27

## 2019-08-27 RX ORDER — METHOCARBAMOL 500 MG/1
750 TABLET, FILM COATED ORAL 4 TIMES DAILY
Status: DISCONTINUED | OUTPATIENT
Start: 2019-08-27 | End: 2019-08-27 | Stop reason: HOSPADM

## 2019-08-27 RX ORDER — NAPROXEN 500 MG/1
500 TABLET ORAL 2 TIMES DAILY PRN
Qty: 20 TABLET | Refills: 0 | Status: SHIPPED | OUTPATIENT
Start: 2019-08-27

## 2019-08-27 RX ORDER — IBUPROFEN 400 MG/1
400 TABLET ORAL EVERY 6 HOURS PRN
COMMUNITY
End: 2019-08-27

## 2019-08-27 RX ORDER — KETOROLAC TROMETHAMINE 30 MG/ML
60 INJECTION, SOLUTION INTRAMUSCULAR; INTRAVENOUS ONCE
Status: COMPLETED | OUTPATIENT
Start: 2019-08-27 | End: 2019-08-27

## 2019-08-27 RX ADMIN — METHOCARBAMOL 750 MG: 500 TABLET ORAL at 16:14

## 2019-08-27 RX ADMIN — KETOROLAC TROMETHAMINE 60 MG: 30 INJECTION INTRAMUSCULAR; INTRAVENOUS at 16:14

## 2019-09-17 ENCOUNTER — APPOINTMENT (OUTPATIENT)
Dept: GENERAL RADIOLOGY | Facility: HOSPITAL | Age: 34
End: 2019-09-17

## 2019-09-17 ENCOUNTER — HOSPITAL ENCOUNTER (EMERGENCY)
Facility: HOSPITAL | Age: 34
Discharge: HOME | End: 2019-09-17
Attending: EMERGENCY MEDICINE | Admitting: EMERGENCY MEDICINE

## 2019-09-17 VITALS
BODY MASS INDEX: 26.6 KG/M2 | WEIGHT: 190 LBS | RESPIRATION RATE: 16 BRPM | HEIGHT: 71 IN | SYSTOLIC BLOOD PRESSURE: 129 MMHG | TEMPERATURE: 98.3 F | HEART RATE: 93 BPM | OXYGEN SATURATION: 97 % | DIASTOLIC BLOOD PRESSURE: 78 MMHG

## 2019-09-17 DIAGNOSIS — S60.051A CONTUSION OF RIGHT LITTLE FINGER WITHOUT DAMAGE TO NAIL, INITIAL ENCOUNTER: Primary | ICD-10-CM

## 2019-09-17 PROCEDURE — 99282 EMERGENCY DEPT VISIT SF MDM: CPT

## 2019-09-17 PROCEDURE — 73140 X-RAY EXAM OF FINGER(S): CPT

## 2019-09-17 PROCEDURE — 99282 EMERGENCY DEPT VISIT SF MDM: CPT | Performed by: EMERGENCY MEDICINE

## 2019-09-17 NOTE — ED PROVIDER NOTES
Subjective   History of Present Illness  History of Present Illness    Chief complaint: Finger injury    Location: Right small    Quality/Severity: Moderate, sharp    Timing/Onset/Duration: Acute onset this morning    Modifying Factors: It hurts to flex the finger, it feels better to remain still    Associated Symptoms: No numbness, tingling, weakness, or change in color or temperature.  There is swelling.  There is no other complaints.    Narrative: This 33-year-old white male presents with right small finger pain.  He injured it while changing a tire this morning.    PCP:  Jose      Review of Systems   Musculoskeletal:        Right small finger pain   Neurological: Negative for weakness and numbness.        Medication List      ASK your doctor about these medications    famotidine 20 MG tablet  Commonly known as:  PEPCID  Take 1 tablet by mouth 2 (Two) Times a Day.     gabapentin 300 MG capsule  Commonly known as:  NEURONTIN     methocarbamol 750 MG tablet  Commonly known as:  ROBAXIN  Take 1 tablet by mouth 3 (Three) Times a Day As Needed for Muscle Spasms.     naproxen 500 MG tablet  Commonly known as:  NAPROSYN  Take 1 tablet by mouth 2 (Two) Times a Day As Needed for Mild Pain . Take   with meals     ondansetron ODT 8 MG disintegrating tablet  Commonly known as:  ZOFRAN ODT  Take 1 tablet by mouth Every 8 (Eight) Hours As Needed for Nausea or   Vomiting.     sucralfate 1 GM/10ML suspension  Commonly known as:  CARAFATE  Take 10 mL by mouth 4 (Four) Times a Day.          Past Medical History:   Diagnosis Date   • Depression    • Migraine    • Shoulder pain        Allergies   Allergen Reactions   • Cinnamon    • Coconut Oil        Past Surgical History:   Procedure Laterality Date   • VASECTOMY         History reviewed. No pertinent family history.    Social History     Socioeconomic History   • Marital status: Single     Spouse name: Not on file   • Number of children: Not on file   • Years of education:  Not on file   • Highest education level: Not on file   Tobacco Use   • Smoking status: Current Every Day Smoker     Packs/day: 0.50     Types: Cigarettes   • Smokeless tobacco: Never Used   Substance and Sexual Activity   • Alcohol use: No   • Drug use: Yes     Types: Marijuana     Comment: occational    • Sexual activity: Defer           Objective   Physical Exam   Constitutional: He is oriented to person, place, and time. He appears well-developed and well-nourished. No distress.   ED Triage Vitals (09/17/19 0805)  Temp: 98.3 °F (36.8 °C)  Heart Rate: 93  Resp: 16  BP: 129/78  SpO2: 97 %  Temp src: Oral  Heart Rate Source: Monitor  Patient Position: Sitting  BP Location: Right arm  FiO2 (%): n/a    The patient's vitals were reviewed by me.  Unless otherwise noted they are within normal limits.     Musculoskeletal:   There is swelling and tenderness upon palpation of the PIP joint of the right small finger.  There is  minimal bruising.  The capillary refill is less than 2 seconds.  The sensation is intact.  There is no joint laxity noted.  The right upper extremity is otherwise without tenderness or deformity and neurovascularly intact   Neurological: He is alert and oriented to person, place, and time.   Skin: Skin is warm and dry. Capillary refill takes less than 2 seconds. No erythema. No pallor.   Nursing note and vitals reviewed.      Procedures           ED Course      8:38 AM, 09/17/19:  The patient's diagnosis of right small finger contusion was discussed with him.  He should ice the right small finger for 20 minutes every 2 hours while he is awake for 2 to 3 days and elevate.  He should take Tylenol or Motrin as needed as directed for pain.  The patient should follow-up with Dr. Garcia in 1 week.  He should return to the emergency department if there is increased pain, numbness, tingling, weakness, change in color or temperature, worse in any way at all.  All the patient's questions were answered he will  be discharged in good condition.            [facility]    Final diagnoses:   Contusion of right little finger without damage to nail, initial encounter              Sunday Boone MD  09/17/19 3304

## 2019-09-17 NOTE — DISCHARGE INSTRUCTIONS
Take Motrin or Tylenol as needed as directed for pain.  Follow-up with Dr. Les Garcia in 1 week.  Return to the emergency department if there is increased pain, numbness, tingling, weakness, change in color or temperature, worse in any way at all.

## 2021-09-19 ENCOUNTER — HOSPITAL ENCOUNTER (EMERGENCY)
Facility: HOSPITAL | Age: 36
Discharge: HOME | End: 2021-09-20
Attending: EMERGENCY MEDICINE | Admitting: EMERGENCY MEDICINE

## 2021-09-19 VITALS
SYSTOLIC BLOOD PRESSURE: 159 MMHG | RESPIRATION RATE: 18 BRPM | TEMPERATURE: 98.2 F | OXYGEN SATURATION: 99 % | HEIGHT: 72 IN | HEART RATE: 110 BPM | DIASTOLIC BLOOD PRESSURE: 97 MMHG | WEIGHT: 210 LBS | BODY MASS INDEX: 28.44 KG/M2

## 2021-09-19 DIAGNOSIS — T16.2XXA EAR FOREIGN BODY, LEFT, INITIAL ENCOUNTER: Primary | ICD-10-CM

## 2021-09-19 PROCEDURE — 99283 EMERGENCY DEPT VISIT LOW MDM: CPT

## 2021-09-19 RX ORDER — LIDOCAINE HYDROCHLORIDE 20 MG/ML
INJECTION, SOLUTION INFILTRATION; PERINEURAL
Status: COMPLETED
Start: 2021-09-19 | End: 2021-09-20

## 2021-09-20 PROCEDURE — 99283 EMERGENCY DEPT VISIT LOW MDM: CPT | Performed by: EMERGENCY MEDICINE

## 2021-09-20 RX ADMIN — LIDOCAINE HYDROCHLORIDE: 20 INJECTION, SOLUTION INFILTRATION; PERINEURAL at 00:06

## 2021-09-20 NOTE — ED PROVIDER NOTES
Subjective   35-year-old male presents complaining of foreign body sensation to left external auditory canal.  Patient states this has been present for most of the day.  Attempted to irrigate with peroxide with no improvement of symptoms.  He denies that this is particularly painful.  Denies that he can feel anything moving at this time but states he thought he could earlier.  No hearing loss.  No bleeding or discharge from the ear.  Patient is otherwise felt well.          Review of Systems   Constitutional: Negative.    HENT:        Per HPI, otherwise negative   Eyes: Negative.    Respiratory: Negative.    Cardiovascular: Negative.    Skin: Negative.    Neurological: Negative.        Past Medical History:   Diagnosis Date   • Depression    • Migraine    • Shoulder pain        Allergies   Allergen Reactions   • Cinnamon    • Coconut Oil        Past Surgical History:   Procedure Laterality Date   • VASECTOMY         No family history on file.    Social History     Socioeconomic History   • Marital status: Single     Spouse name: Not on file   • Number of children: Not on file   • Years of education: Not on file   • Highest education level: Not on file   Tobacco Use   • Smoking status: Current Every Day Smoker     Packs/day: 0.50     Types: Cigarettes   • Smokeless tobacco: Never Used   Substance and Sexual Activity   • Alcohol use: No   • Drug use: Yes     Types: Marijuana     Comment: occational    • Sexual activity: Defer           Objective   Physical Exam  Constitutional:       General: He is not in acute distress.     Appearance: Normal appearance. He is not ill-appearing, toxic-appearing or diaphoretic.   HENT:      Head: Normocephalic and atraumatic.      Ears:      Comments: Left external ear normal in appearance.  Left external auditory canal with mild irritation and erythema with no cobblestoning or other signs of otitis externa.  TM clearly visualized, intact, no effusion, no erythema.  There is a foreign  body in the external auditory canal.  This may be a small insect versus large piece of earwax.     Mouth/Throat:      Mouth: Mucous membranes are moist.      Pharynx: Oropharynx is clear.   Eyes:      Extraocular Movements: Extraocular movements intact.      Conjunctiva/sclera: Conjunctivae normal.      Pupils: Pupils are equal, round, and reactive to light.   Cardiovascular:      Rate and Rhythm: Normal rate and regular rhythm.   Pulmonary:      Effort: Pulmonary effort is normal. No respiratory distress.   Skin:     General: Skin is warm and dry.      Capillary Refill: Capillary refill takes less than 2 seconds.   Neurological:      General: No focal deficit present.      Mental Status: He is alert and oriented to person, place, and time.         Procedures           ED Course  ED Course as of Sep 20 0109   Mon Sep 20, 2021   0107 Patient does appear to have a foreign body in his ear.  Nursing staff injected small amount of 2% lidocaine as patient reported that he did feel like there was some sensation of movement earlier.  By time of my evaluation, it is not clear to me that this is an insect versus a large piece of earwax.  There was no movement observed on my evaluation.  Attempted gentle irrigation, retrieval with ear loop, but patient did not tolerate this at all.  Ultimately he chose to have no further intervention here.  Discussed options for follow-up.  Advised patient he might try continued gentle irrigation but should follow-up with ENT as soon as possible if he still has symptoms when he awakens in the morning.    [TD]      ED Course User Index  [TD] Rick Crocker MD                                           [facility]    Final diagnoses:   Ear foreign body, left, initial encounter       ED Disposition  ED Disposition     ED Disposition Condition Comment    Discharge Stable           Jamal Hyde MD  [address]  [place] [address]  [phone]    Call in 1 day           Medication  List      No changes were made to your prescriptions during this visit.          Rick Crocker MD  09/20/21 0104

## 2023-09-11 ENCOUNTER — HOSPITAL ENCOUNTER (EMERGENCY)
Facility: HOSPITAL | Age: 38
Discharge: HOME | End: 2023-09-11
Attending: EMERGENCY MEDICINE | Admitting: EMERGENCY MEDICINE
Payer: COMMERCIAL

## 2023-09-11 ENCOUNTER — APPOINTMENT (OUTPATIENT)
Dept: MRI IMAGING | Facility: HOSPITAL | Age: 38
End: 2023-09-11
Payer: COMMERCIAL

## 2023-09-11 VITALS
BODY MASS INDEX: 23.7 KG/M2 | DIASTOLIC BLOOD PRESSURE: 85 MMHG | HEART RATE: 71 BPM | HEIGHT: 72 IN | WEIGHT: 175 LBS | SYSTOLIC BLOOD PRESSURE: 125 MMHG | RESPIRATION RATE: 18 BRPM | OXYGEN SATURATION: 100 % | TEMPERATURE: 98.5 F

## 2023-09-11 DIAGNOSIS — G62.9 PERIPHERAL POLYNEUROPATHY: Primary | ICD-10-CM

## 2023-09-11 PROCEDURE — 99284 EMERGENCY DEPT VISIT MOD MDM: CPT

## 2023-09-11 PROCEDURE — 72148 MRI LUMBAR SPINE W/O DYE: CPT

## 2023-09-11 RX ORDER — DULOXETIN HYDROCHLORIDE 30 MG/1
30 CAPSULE, DELAYED RELEASE ORAL DAILY
COMMUNITY

## 2023-09-11 NOTE — ED PROVIDER NOTES
" EMERGENCY DEPARTMENT ENCOUNTER      Room Number: 09/09    History is provided by the patient, no translation services needed    HPI:    Chief complaint: leg pain    Narrative: Pt is a 37 y.o. male who presents complaining of right leg pain/weakness.  Patient reports for the last month he has been experiencing some weakness and tingling in the right lower leg.  Reports he feels it from the knee down.  However it has been progressively worsening and today while he was driving he reports he tried to go from the gas pedal to the brake and was unable to.  Reports he had to hit the brakes with his left foot.  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  He states this has been progressively working worsening over the last month.  He denies any bowel or bladder incontinence but does report feeling a sensation that he is not fully emptying his bladder.  He denies any history of IV drug use.  No recent fevers.  No back pain.  No recent trauma.      PMD: Les Garcia MD    REVIEW OF SYSTEMS  Review of Systems  Complete review of systems performed otherwise negative except pertinent positives per HPI.    PAST MEDICAL HISTORY  Active Ambulatory Problems     Diagnosis Date Noted    No Active Ambulatory Problems     Resolved Ambulatory Problems     Diagnosis Date Noted    No Resolved Ambulatory Problems     Past Medical History:   Diagnosis Date    Depression     Migraine     Shoulder pain        PAST SURGICAL HISTORY  Past Surgical History:   Procedure Laterality Date    VASECTOMY         FAMILY HISTORY  History reviewed. No pertinent family history.    SOCIAL HISTORY  Social History     Socioeconomic History    Marital status: Single   Tobacco Use    Smoking status: Every Day     Packs/day: 0.50     Types: Cigarettes    Smokeless tobacco: Never   Substance and Sexual Activity    Alcohol " use: No    Drug use: Yes     Types: Marijuana     Comment: occational     Sexual activity: Defer       ALLERGIES  Cinnamon and Coconut (cocos nucifera)    No current facility-administered medications for this encounter.    Current Outpatient Medications:     DULoxetine (CYMBALTA) 30 MG capsule, Take 1 capsule by mouth Daily., Disp: , Rfl:     PHYSICAL EXAM  ED Triage Vitals   Temp Heart Rate Resp BP SpO2   09/11/23 1654 09/11/23 1649 09/11/23 1649 09/11/23 1649 09/11/23 1649   98.5 °F (36.9 °C) 71 18 125/85 100 %      Temp src Heart Rate Source Patient Position BP Location FiO2 (%)   09/11/23 1649 -- 09/11/23 1649 09/11/23 1649 --   Oral  Lying Right arm        Physical Exam  Vitals and nursing note reviewed.   Constitutional:       Appearance: Normal appearance. He is normal weight. He is not ill-appearing or toxic-appearing.   HENT:      Head: Normocephalic and atraumatic.      Nose: Nose normal.      Mouth/Throat:      Mouth: Mucous membranes are moist.   Eyes:      Extraocular Movements: Extraocular movements intact.      Conjunctiva/sclera: Conjunctivae normal.      Pupils: Pupils are equal, round, and reactive to light.   Cardiovascular:      Rate and Rhythm: Normal rate and regular rhythm.      Pulses: Normal pulses.   Pulmonary:      Effort: Pulmonary effort is normal.      Breath sounds: Normal breath sounds. No stridor. No wheezing.   Abdominal:      General: Abdomen is flat.      Palpations: Abdomen is soft.      Tenderness: There is no abdominal tenderness.   Musculoskeletal:      Cervical back: Normal range of motion. No tenderness.      Right lower leg: Normal. No swelling.      Left lower leg: Normal. No swelling.   Skin:     General: Skin is warm.      Capillary Refill: Capillary refill takes less than 2 seconds.      Coloration: Skin is not pale.   Neurological:      General: No focal deficit present.      Mental Status: He is alert.      Sensory: No sensory deficit (abnormal sensation of lower leg  following peroneal nerve distribution).      Motor: Weakness (weakness of right lower leg) present.      Gait: Gait abnormal.      Deep Tendon Reflexes:      Reflex Scores:       Patellar reflexes are 2+ on the right side and 2+ on the left side.  Psychiatric:         Mood and Affect: Mood normal.         LAB RESULTS  Lab Results (last 24 hours)       ** No results found for the last 24 hours. **              I ordered the above labs and reviewed the results    RADIOLOGY  MRI Lumbar Spine Without Contrast    Result Date: 9/11/2023  MRI LUMBAR SPINE WO CONTRAST-: 9/11/2023 6:32 PM  CLINICAL HISTORY: Right leg pain for 1 month but worse today.  TECHNIQUE: Multiplanar, multisequence MRI of the lumbar spine without IV contrast.  COMPARISON: None.  FINDINGS:  This report assumes that there are 5 lumbar-type vertebra with the last fully formed disc space representing L5-S1.  Sagittal alignment is satisfactory. Vertebral body heights are maintained. No focal aggressive marrow lesion is identified. The conus terminates at an expected level. Visualized distal cord signal is within normal limits.  Multilevel degenerative changes noted. Most notably there is concentric disc bulge at L5-S1 causing moderate subarticular narrowing on both sides and likely subtle abutment of the descending S1 nerve roots bilaterally. Additionally there is at least moderate bilateral foraminal narrowing. Probable abutment of both exiting L5 nerve roots about the L5-S1 disc bulge.  Full level-by-level degenerative change descriptions are not described on this exam performed at the emergency department to assess for emergent findings.        1.  Disc bulge at L5-S1 causes bilateral subarticular and foraminal narrowing.   This report was finalized on 9/11/2023 7:07 PM by Felton Wise MD.       I ordered the above radiologic testing and reviewed the results    PROCEDURES  Procedures      PROGRESS AND CONSULTS  ED Course as of 09/11/23 1944   Mon Sep 11,  2023 1913 MRI lumbar spine: 1.  Disc bulge at L5-S1 causes bilateral subarticular and foraminal  narrowing.   [KM]      ED Course User Index  [KM] Mariana Ron, MARVIN           MEDICAL DECISION MAKING    MDM     Amount and/or Complexity of Data Reviewed  Tests in the radiology section of CPT®: reviewed    37-year-old male seen and evaluated for right leg pain and weakness.  On arrival he is alert, oriented and in no acute distress.  His vitals are stable and he is afebrile.  On physical exam however he does have weakness of the right lower leg compared to the left.  Sensation is also slightly diminished on the lateral aspect of the leg.  Reflexes are intact.  I think this fits more so with a peripheral neuropathy or a spine issue as opposed to an acute stroke.  Patient also has no findings concerning for cauda equina at this time.  He has not been experiencing fevers, he has no history of IV drug use.  He reports this has been progressing over the last month but has significantly worsened over the last day therefore MRI of the lumbar spine performed.  This reveals a disc bulge at L5-S1.  I consulted with neurosurgery and spoke with Dr. Liang who personally reviewed the imaging and states that the disc bulging is not significant enough to cause the peripheral neuropathy patient is experiencing.  Therefore it is recommended that patient has an EMG study as an outpatient.  I have ordered this and have given patient information to follow-up with Dr. Liang.  Patient is already taking Cymbalta that he was prescribed when he was seen for this previously.  I think he should continue taking this as opposed to changing his medication regimen at this time.  Strict return precautions given and patient was discharged home in stable condition.       DIAGNOSIS  Final diagnoses:   Peripheral polyneuropathy       Latest Documented Vital Signs:  As of 19:44 EDT  BP- 125/85 HR- 71 Temp- 98.5 °F (36.9 °C) (Oral) O2 sat-  100%    DISPOSITION    Discussed pertinent findings with the patient/family.  Patient/Family voiced understanding of need to follow-up for recheck and further testing as needed.  Return to the Emergency Department warnings were given.         Medication List      No changes were made to your prescriptions during this visit.              Follow-up Information       Felton Liang MD. Call in 1 day.    Specialty: Neurosurgery  Why: To schedule follow up appointment  Contact information:  [address] [facility] [address] [place] [id number] [phone]                               Dictated utilizing Dragon dictation     Mariana Ron PA-C  09/11/23 1941

## 2023-09-11 NOTE — Clinical Note
[place]  [facility] EMERGENCY DEPARTMENT  [address] [place] [address]  Phone: [phone]    Sarah Mcclain was seen and treated in our emergency department on 9/11/2023.  He may return to work on 09/12/2023.         Thank you for choosing [facility].    Mariana Ron PA-C

## 2023-09-11 NOTE — DISCHARGE INSTRUCTIONS
You will be called by [facility] to have an EMG study performed to assess the nerve function of your lower legs.  After that you will follow-up with Dr. Liang of neurosurgery.

## 2023-09-22 ENCOUNTER — TELEPHONE (OUTPATIENT)
Dept: URGENT CARE | Facility: CLINIC | Age: 38
End: 2023-09-22
Payer: COMMERCIAL

## 2023-09-22 NOTE — TELEPHONE ENCOUNTER
Left message with person answering phone that had lab results. She states he is sleeping for his work. Patient called back. He reports his urinary frequency with dark urine has resolved. He still has a sore throat but chills and sweating have resolved. He will complete his antibiotics and followup for unresolved symptoms

## 2025-02-17 ENCOUNTER — APPOINTMENT (OUTPATIENT)
Dept: GENERAL RADIOLOGY | Facility: HOSPITAL | Age: 40
End: 2025-02-17
Payer: COMMERCIAL

## 2025-02-17 ENCOUNTER — HOSPITAL ENCOUNTER (EMERGENCY)
Facility: HOSPITAL | Age: 40
Discharge: HOME | End: 2025-02-17
Attending: STUDENT IN AN ORGANIZED HEALTH CARE EDUCATION/TRAINING PROGRAM | Admitting: STUDENT IN AN ORGANIZED HEALTH CARE EDUCATION/TRAINING PROGRAM
Payer: COMMERCIAL

## 2025-02-17 VITALS
DIASTOLIC BLOOD PRESSURE: 82 MMHG | WEIGHT: 175 LBS | SYSTOLIC BLOOD PRESSURE: 126 MMHG | HEIGHT: 72 IN | OXYGEN SATURATION: 99 % | HEART RATE: 96 BPM | RESPIRATION RATE: 18 BRPM | TEMPERATURE: 98 F | BODY MASS INDEX: 23.7 KG/M2

## 2025-02-17 DIAGNOSIS — U07.1 COVID: Primary | ICD-10-CM

## 2025-02-17 LAB
ALBUMIN SERPL-MCNC: 4.8 G/DL (ref 3.5–5.2)
ALBUMIN/GLOB SERPL: 1.4 G/DL
ALP SERPL-CCNC: 96 U/L (ref 39–117)
ALT SERPL W P-5'-P-CCNC: 20 U/L (ref 1–41)
ANION GAP SERPL CALCULATED.3IONS-SCNC: 11.3 MMOL/L (ref 5–15)
AST SERPL-CCNC: 28 U/L (ref 1–40)
BASOPHILS # BLD AUTO: 0.04 10*3/MM3 (ref 0–0.2)
BASOPHILS NFR BLD AUTO: 0.5 % (ref 0–1.5)
BILIRUB SERPL-MCNC: 0.4 MG/DL (ref 0–1.2)
BUN SERPL-MCNC: 6 MG/DL (ref 6–20)
BUN/CREAT SERPL: 8 (ref 7–25)
CALCIUM SPEC-SCNC: 9.3 MG/DL (ref 8.6–10.5)
CHLORIDE SERPL-SCNC: 100 MMOL/L (ref 98–107)
CO2 SERPL-SCNC: 24.7 MMOL/L (ref 22–29)
CREAT SERPL-MCNC: 0.75 MG/DL (ref 0.76–1.27)
DEPRECATED RDW RBC AUTO: 39.6 FL (ref 37–54)
EGFRCR SERPLBLD CKD-EPI 2021: 118.5 ML/MIN/1.73
EOSINOPHIL # BLD AUTO: 0.44 10*3/MM3 (ref 0–0.4)
EOSINOPHIL NFR BLD AUTO: 5.9 % (ref 0.3–6.2)
ERYTHROCYTE [DISTWIDTH] IN BLOOD BY AUTOMATED COUNT: 12 % (ref 12.3–15.4)
FLUAV RNA RESP QL NAA+PROBE: NOT DETECTED
FLUBV RNA RESP QL NAA+PROBE: NOT DETECTED
GLOBULIN UR ELPH-MCNC: 3.4 GM/DL
GLUCOSE SERPL-MCNC: 101 MG/DL (ref 65–99)
HCT VFR BLD AUTO: 45.9 % (ref 37.5–51)
HGB BLD-MCNC: 15.4 G/DL (ref 13–17.7)
HOLD SPECIMEN: NORMAL
HOLD SPECIMEN: NORMAL
IMM GRANULOCYTES # BLD AUTO: 0.03 10*3/MM3 (ref 0–0.05)
IMM GRANULOCYTES NFR BLD AUTO: 0.4 % (ref 0–0.5)
LYMPHOCYTES # BLD AUTO: 1.1 10*3/MM3 (ref 0.7–3.1)
LYMPHOCYTES NFR BLD AUTO: 14.8 % (ref 19.6–45.3)
MCH RBC QN AUTO: 30.4 PG (ref 26.6–33)
MCHC RBC AUTO-ENTMCNC: 33.6 G/DL (ref 31.5–35.7)
MCV RBC AUTO: 90.5 FL (ref 79–97)
MONOCYTES # BLD AUTO: 0.65 10*3/MM3 (ref 0.1–0.9)
MONOCYTES NFR BLD AUTO: 8.8 % (ref 5–12)
NEUTROPHILS NFR BLD AUTO: 5.15 10*3/MM3 (ref 1.7–7)
NEUTROPHILS NFR BLD AUTO: 69.6 % (ref 42.7–76)
NRBC BLD AUTO-RTO: 0 /100 WBC (ref 0–0.2)
PLATELET # BLD AUTO: 215 10*3/MM3 (ref 140–450)
PMV BLD AUTO: 9.6 FL (ref 6–12)
POTASSIUM SERPL-SCNC: 3.8 MMOL/L (ref 3.5–5.2)
PROT SERPL-MCNC: 8.2 G/DL (ref 6–8.5)
RBC # BLD AUTO: 5.07 10*6/MM3 (ref 4.14–5.8)
RSV RNA RESP QL NAA+PROBE: NOT DETECTED
SARS-COV-2 RNA RESP QL NAA+PROBE: DETECTED
SODIUM SERPL-SCNC: 136 MMOL/L (ref 136–145)
TROPONIN T SERPL HS-MCNC: 7 NG/L
WBC NRBC COR # BLD AUTO: 7.41 10*3/MM3 (ref 3.4–10.8)
WHOLE BLOOD HOLD COAG: NORMAL
WHOLE BLOOD HOLD SPECIMEN: NORMAL

## 2025-02-17 PROCEDURE — 93005 ELECTROCARDIOGRAM TRACING: CPT | Performed by: STUDENT IN AN ORGANIZED HEALTH CARE EDUCATION/TRAINING PROGRAM

## 2025-02-17 PROCEDURE — 85025 COMPLETE CBC W/AUTO DIFF WBC: CPT | Performed by: STUDENT IN AN ORGANIZED HEALTH CARE EDUCATION/TRAINING PROGRAM

## 2025-02-17 PROCEDURE — 93010 ELECTROCARDIOGRAM REPORT: CPT | Performed by: STUDENT IN AN ORGANIZED HEALTH CARE EDUCATION/TRAINING PROGRAM

## 2025-02-17 PROCEDURE — 87637 SARSCOV2&INF A&B&RSV AMP PRB: CPT | Performed by: STUDENT IN AN ORGANIZED HEALTH CARE EDUCATION/TRAINING PROGRAM

## 2025-02-17 PROCEDURE — 93005 ELECTROCARDIOGRAM TRACING: CPT

## 2025-02-17 PROCEDURE — 99284 EMERGENCY DEPT VISIT MOD MDM: CPT | Performed by: STUDENT IN AN ORGANIZED HEALTH CARE EDUCATION/TRAINING PROGRAM

## 2025-02-17 PROCEDURE — 84484 ASSAY OF TROPONIN QUANT: CPT | Performed by: STUDENT IN AN ORGANIZED HEALTH CARE EDUCATION/TRAINING PROGRAM

## 2025-02-17 PROCEDURE — 80053 COMPREHEN METABOLIC PANEL: CPT | Performed by: STUDENT IN AN ORGANIZED HEALTH CARE EDUCATION/TRAINING PROGRAM

## 2025-02-17 PROCEDURE — 71046 X-RAY EXAM CHEST 2 VIEWS: CPT

## 2025-02-17 RX ORDER — SODIUM CHLORIDE 0.9 % (FLUSH) 0.9 %
10 SYRINGE (ML) INJECTION AS NEEDED
Status: DISCONTINUED | OUTPATIENT
Start: 2025-02-17 | End: 2025-02-17 | Stop reason: HOSPADM

## 2025-02-17 NOTE — Clinical Note
[facility] EMERGENCY DEPARTMENT  [address]  [place] [address]  Phone: [phone]    Sarah Mcclain was seen and treated in our emergency department on 2/17/2025.  He may return to work on 02/24/2025.         Thank you for choosing [facility].    Tasha Mcclure PA-C       declines

## 2025-02-17 NOTE — Clinical Note
[facility] EMERGENCY DEPARTMENT  [address]  [place] [address]  Phone: [phone]    Sarah Mcclain was seen and treated in our emergency department on 2/17/2025.  He may return to work on 02/21/2025.         Thank you for choosing [facility].    Tasha Mcclure PA-C

## 2025-02-18 LAB
QT INTERVAL: 320 MS
QTC INTERVAL: 405 MS